# Patient Record
Sex: MALE | Race: WHITE | URBAN - METROPOLITAN AREA
[De-identification: names, ages, dates, MRNs, and addresses within clinical notes are randomized per-mention and may not be internally consistent; named-entity substitution may affect disease eponyms.]

---

## 2022-10-19 ENCOUNTER — OFFICE VISIT (OUTPATIENT)
Dept: ENDOCRINOLOGY | Age: 22
End: 2022-10-19
Payer: COMMERCIAL

## 2022-10-19 VITALS
SYSTOLIC BLOOD PRESSURE: 114 MMHG | DIASTOLIC BLOOD PRESSURE: 65 MMHG | HEIGHT: 69 IN | BODY MASS INDEX: 21.51 KG/M2 | WEIGHT: 145.2 LBS | HEART RATE: 81 BPM

## 2022-10-19 DIAGNOSIS — F64.0 GENDER DYSPHORIA IN ADULT: Primary | ICD-10-CM

## 2022-10-19 DIAGNOSIS — L65.9 HAIR LOSS: ICD-10-CM

## 2022-10-19 PROCEDURE — 99204 OFFICE O/P NEW MOD 45 MIN: CPT | Performed by: INTERNAL MEDICINE

## 2022-10-19 RX ORDER — FINASTERIDE 1 MG/1
1 TABLET, FILM COATED ORAL DAILY
COMMUNITY
Start: 2022-07-12

## 2022-10-19 RX ORDER — VORTIOXETINE 10 MG/1
10 TABLET, FILM COATED ORAL DAILY
COMMUNITY
Start: 2022-10-11

## 2022-10-19 RX ORDER — ESTRADIOL 2 MG/1
3 TABLET ORAL DAILY
COMMUNITY

## 2022-10-19 RX ORDER — SPIRONOLACTONE 50 MG/1
50 TABLET, FILM COATED ORAL 3 TIMES DAILY
COMMUNITY

## 2022-10-19 NOTE — PROGRESS NOTES
Chief Complaint   Patient presents with    Hormone Problem     Pcp and pharmacy verified   Male to [facility], [facility]     History of Present Illness: Maki Hansen is a 20 y.o. 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 Pt notes that she has been on the HRT for \"a year and a half\". Pt reports that she first new that she identified as female \"about 2 and a half years ago\". She is currently seeing a counselor \"I have been seeing him, even before I decided to start the transition\". She has been seeing Babak Dao. I will request records from Babak jose. She has been receiving HRT from [facility]. [facility] She is currently taking Estradiol 3mg (1-1/2 of the 2mg pills) per day (she has been on this dose for 8 months. She is taking Spironolactone 50mg TID. (She has been on this dose for 8 months). She is taking Finasteride 1mg daily which she is taking for \"hair loss\". She has noted breast development, fat redistribution, she notes some decrease in her libido and she notes that \"It has been taking some reconfiguration with my anti-depressants, but things are doing well. She notes her skin has softened and her face has become more rounded and less angular. She would like to know where she is biochemically and what more could be expected from a medical only and non-surgical intervention. She notes she did have laser hair removal, but she is still shaving about once every week and half. She notes she has had issues of mood swings \"my emotions feel the same, just clearer\". She denies issues of CP, SOB, palpitations, heat or cold intolerance, she notes she has had 2-3 \"hot flashes\" in the last 2-3 months. She denies issues of syncope or pre-presyncope. She notes that she does have a good social support structure. \"I am perusing changing my name. \"    \"I would like to try minoxidil but because of my eczema I can not take the topicals so I would like to discus an oral minoxidil. \"    History reviewed. No pertinent past medical history. Past Surgical History:   Procedure Laterality Date    MD REPAIR OF ANAL SPHINCTER,ADULT  2019     Current Outpatient Medications   Medication Sig    Trintellix 10 mg tablet Take 10 mg by mouth daily. finasteride (PROPECIA) 1 mg tablet Take 1 mg by mouth daily. spironolactone (ALDACTONE) 50 mg tablet Take 50 mg by mouth three (3) times daily. estradioL (Estrace) 2 mg tablet Take 3 mg by mouth daily. No current facility-administered medications for this visit.      No Known Allergies  Family History   Problem Relation Age of Onset    No Known Problems Mother     Diabetes Father         Type 1    No Known Problems Brother     No Known Problems Brother     No Known Problems Maternal Grandmother     Heart Disease Maternal Grandfather         CABG x3    No Known Problems Paternal Grandmother     Cancer Paternal Grandfather         Prostate     Social History     Socioeconomic History    Marital status: Not on file     Spouse name: Not on file    Number of children: Not on file    Years of education: Not on file    Highest education level: Not on file   Occupational History    Not on file   Tobacco Use    Smoking status: Former     Types: Cigarettes    Smokeless tobacco: Never   Substance and Sexual Activity    Alcohol use: Yes     Comment: occ    Drug use: Not Currently    Sexual activity: Not on file   Other Topics Concern    Not on file   Social History Narrative    Not on file     Social Determinants of Health     Financial Resource Strain: Not on file   Food Insecurity: Not on file   Transportation Needs: Not on file   Physical Activity: Not on file   Stress: Not on file   Social Connections: Not on file   Intimate Partner Violence: Not on file   Housing Stability: Not on file     Review of Systems:  Constitutional Symptoms: no fevers, chills, weight loss  Eyes: no blurry vision or double vision  Cardiovascular: no chest pain or palpitations  Respiratory: no cough or shortness of breath  Gastrointestinal: no dysphagia or abdominal pain  Musculoskeletal: no joint pains or weakness  Integumentary: no rashes  Neurological: no numbness, tingling, or headaches  Psychiatric: no depression or anxiety  Endocrine: no heat or cold intolerance, no polyuria or polydipsia    Physical Examination:  Blood pressure 114/65, pulse 81, height 5' 9\" (1.753 m), weight 145 lb 3.2 oz (65.9 kg). General: pleasant, no distress, good eye contact  HEENT: no exopthalmos, no periorbital edema, no scleral/conjunctival injection, EOMI, no lid lag or stare  Neck: supple, no thyromegaly, masses, lymph nodes, or carotid bruits, no supraclavicular or dorsocervical fat pads  Cardiovascular: regular, normal rate, normal S1 and S2, no murmurs/rubs/gallops, 2+ dorsalis pedis pulses bilaterally  Respiratory: clear to auscultation bilaterally  Gastrointestinal: soft, nontender, nondistended, no masses, no hepatosplenomegaly  Musculoskeletal: no proximal muscle weakness in upper or lower extremities  Integumentary: no acanthosis nigricans, no rashes, no edema,   Neurological: reflexes 2+ at biceps, no tremor  Psychiatric: normal mood and affect    Data Reviewed:     Assessment/Plan:   1. Gender dysphoria in adult    2. Hair loss    1) She has been taking Estrogen and Spironolactone for a year an a half, she is tolerating these well and notes she has had expected changes with the HRT. We did discuss what she could expect with continued therapy and potential side effects of HRT. She reports she does have a good network of social support. I will order Testosterone, Estrogen, CBC and CMP. We can discuss changing her dose of Estrogen, the Spironolactone and we can discuss starting her on progesterone. 2) Pt is taking Finasteride 1mg daily for her hair loss and wants to look into oral Minoxidil.  We will get her labs back and discuss options. Pt voices understanding and agreement with the plan.     RTC 4 months    Copy sent to:  [address]

## 2022-12-22 RX ORDER — SPIRONOLACTONE 100 MG/1
100 TABLET, FILM COATED ORAL 2 TIMES DAILY
Qty: 180 TABLET | Refills: 1 | Status: SHIPPED | OUTPATIENT
Start: 2022-12-22

## 2022-12-22 RX ORDER — ESTRADIOL 2 MG/1
2 TABLET ORAL 2 TIMES DAILY
Qty: 180 TABLET | Refills: 1 | Status: SHIPPED | OUTPATIENT
Start: 2022-12-22

## 2023-02-21 ENCOUNTER — OFFICE VISIT (OUTPATIENT)
Dept: ENDOCRINOLOGY | Age: 23
End: 2023-02-21
Payer: COMMERCIAL

## 2023-02-21 VITALS
SYSTOLIC BLOOD PRESSURE: 114 MMHG | BODY MASS INDEX: 21.98 KG/M2 | DIASTOLIC BLOOD PRESSURE: 62 MMHG | HEIGHT: 69 IN | HEART RATE: 87 BPM | WEIGHT: 148.4 LBS

## 2023-02-21 DIAGNOSIS — F64.0 GENDER DYSPHORIA IN ADULT: Primary | ICD-10-CM

## 2023-02-21 DIAGNOSIS — L65.9 ALOPECIA: ICD-10-CM

## 2023-02-21 PROCEDURE — 99214 OFFICE O/P EST MOD 30 MIN: CPT | Performed by: INTERNAL MEDICINE

## 2023-02-21 RX ORDER — ESTRADIOL 2 MG/1
2 TABLET ORAL 2 TIMES DAILY
Qty: 180 TABLET | Refills: 1 | Status: SHIPPED | OUTPATIENT
Start: 2023-02-21

## 2023-02-21 RX ORDER — SPIRONOLACTONE 100 MG/1
100 TABLET, FILM COATED ORAL 2 TIMES DAILY
Qty: 180 TABLET | Refills: 1 | Status: SHIPPED | OUTPATIENT
Start: 2023-02-21

## 2023-02-21 RX ORDER — FINASTERIDE 1 MG/1
1 TABLET, FILM COATED ORAL DAILY
Qty: 90 TABLET | Refills: 3 | Status: SHIPPED | OUTPATIENT
Start: 2023-02-21

## 2023-02-21 RX ORDER — PROMETHAZINE HYDROCHLORIDE 25 MG/1
TABLET ORAL AS NEEDED
COMMUNITY
Start: 2023-02-08

## 2023-02-21 NOTE — PROGRESS NOTES
Chief Complaint   Patient presents with    Hormone Problem     Pcp and pharmacy verified     History of Present Illness: Steven Ruano is a 20 y.o. 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 In December 2022 I increased her Estrogen to 2mg BID and her Spironolactone to 100mg BID. \"I did notice after getting back on the Estradiol. The nausea seems to be worse from the Trintellix. \"  She is taking the Estroen 2mg BID, She notes she does not get nausea in the evening or after she takes her Estrogen, but every morning when she wakes. She is taking the Spironolactone 100mg BID. She denies issues of CP, SOB, palpitations, HA, vision changes. He notes some breast tenderness. Pt is still taking the Trintellix 10mg every day for her depression. Pt is taking Finasteride 1mg daily. She feels that this dose is helping with the Alopecia. Overall she is feeling pleased with the overall changes and results from the HRT. She notes she did have laser hair removal, but she is still shaving about once every week and half. She denies any mood swings or mood changes. She denies issues of CP, SOB, palpitations, heat or cold intolerance, she notes she has had 2-3 \"hot flashes\" in the last 2-3 months. She denies issues of syncope or pre-presyncope. She notes that she does have a good social support structure. \"I am perusing changing my name. \"    Current Outpatient Medications   Medication Sig    promethazine (PHENERGAN) 25 mg tablet as needed. estradioL (Estrace) 2 mg tablet Take 1 Tablet by mouth two (2) times a day. spironolactone (ALDACTONE) 100 mg tablet Take 1 Tablet by mouth two (2) times a day. finasteride (PROPECIA) 1 mg tablet Take 1 Tablet by mouth daily. Trintellix 10 mg tablet Take 10 mg by mouth daily. No current facility-administered medications for this visit.      No Known Allergies  Review of Systems:  - Cardiovascular: no chest pain  - Neurological: no tremors  - Integumentary: skin is normal    Physical Examination:  Blood pressure 114/62, pulse 87, height 5' 9\" (1.753 m), weight 148 lb 6.4 oz (67.3 kg). General: pleasant, no distress, good eye contact   Neck: no thyromegaly or thyroid bruits  Cardiovascular: regular, normal rate, nl s1 and s2, no m/r/g   Integumentary: skin is normal, no edema  Neurological: reflexes 2+ at biceps, no tremors  Psychiatric: normal mood and affect    Data Reviewed:   - none new for review    Assessment/Plan:   1) Male to Female Transgender > Pt is doing well with the Estrogen 2mg BID. His Testosterone was still very high so we increased his Spironolactone to 100mg BID. I will order a Testosterone level, CBC and CMP today as well as an Estrogen level. We discussed that if his Testosterone is still very high we can discuss other Testosterone blocking agents. 2) Alopecia > Pt notes the Finasteride is helping with the Alopecia. I will re-order the Finasteride 1mg daily. Pt voices understanding and agreement with the plan.     RTC 4 months

## 2023-03-03 DIAGNOSIS — F64.0 GENDER DYSPHORIA IN ADULT: Primary | ICD-10-CM

## 2023-04-22 DIAGNOSIS — F64.0 GENDER DYSPHORIA IN ADULT: Primary | ICD-10-CM

## 2023-04-24 DIAGNOSIS — F64.0 GENDER DYSPHORIA IN ADULT: Primary | ICD-10-CM

## 2023-05-17 RX ORDER — SPIRONOLACTONE 100 MG/1
100 TABLET, FILM COATED ORAL 2 TIMES DAILY
COMMUNITY
Start: 2023-02-21

## 2023-05-17 RX ORDER — FINASTERIDE 1 MG/1
1 TABLET, FILM COATED ORAL DAILY
COMMUNITY
Start: 2023-02-21 | End: 2023-06-27 | Stop reason: SDUPTHER

## 2023-05-17 RX ORDER — PROMETHAZINE HYDROCHLORIDE 25 MG/1
TABLET ORAL PRN
COMMUNITY
Start: 2023-02-08 | End: 2023-06-27

## 2023-05-17 RX ORDER — ESTRADIOL 2 MG/1
2 TABLET ORAL DAILY
COMMUNITY
Start: 2023-02-21 | End: 2023-06-27 | Stop reason: SDUPTHER

## 2023-06-01 DIAGNOSIS — F64.0 GENDER DYSPHORIA IN ADULT: ICD-10-CM

## 2023-06-27 ENCOUNTER — OFFICE VISIT (OUTPATIENT)
Age: 23
End: 2023-06-27
Payer: COMMERCIAL

## 2023-06-27 VITALS
DIASTOLIC BLOOD PRESSURE: 70 MMHG | SYSTOLIC BLOOD PRESSURE: 114 MMHG | HEART RATE: 68 BPM | WEIGHT: 145 LBS | BODY MASS INDEX: 21.48 KG/M2 | HEIGHT: 69 IN

## 2023-06-27 DIAGNOSIS — F64.0 GENDER DYSPHORIA IN ADULT: Primary | ICD-10-CM

## 2023-06-27 DIAGNOSIS — L65.9 NONSCARRING HAIR LOSS, UNSPECIFIED: ICD-10-CM

## 2023-06-27 PROCEDURE — 99214 OFFICE O/P EST MOD 30 MIN: CPT | Performed by: INTERNAL MEDICINE

## 2023-06-27 RX ORDER — FINASTERIDE 1 MG/1
1 TABLET, FILM COATED ORAL DAILY
Qty: 90 TABLET | Refills: 1 | Status: SHIPPED | OUTPATIENT
Start: 2023-06-27

## 2023-06-27 RX ORDER — BICALUTAMIDE 50 MG/1
50 TABLET, FILM COATED ORAL DAILY
Qty: 90 TABLET | Refills: 3 | Status: SHIPPED | OUTPATIENT
Start: 2023-06-27

## 2023-06-27 RX ORDER — ESTRADIOL 2 MG/1
2 TABLET ORAL DAILY
Qty: 90 TABLET | Refills: 1 | Status: SHIPPED | OUTPATIENT
Start: 2023-06-27

## 2023-08-15 RX ORDER — ESTRADIOL 2 MG/1
2 TABLET ORAL 2 TIMES DAILY
Qty: 180 TABLET | Refills: 1 | Status: SHIPPED | OUTPATIENT
Start: 2023-08-15

## 2023-08-15 NOTE — TELEPHONE ENCOUNTER
Per last OV: In December 2022 I increased her Estrogen to 2mg BID. Patient states that she has been taking BID for about 2 years. Last prescription was written as once daily. Is running out.

## 2023-10-31 ENCOUNTER — OFFICE VISIT (OUTPATIENT)
Age: 23
End: 2023-10-31
Payer: COMMERCIAL

## 2023-10-31 VITALS
SYSTOLIC BLOOD PRESSURE: 118 MMHG | HEART RATE: 76 BPM | HEIGHT: 69 IN | BODY MASS INDEX: 21.48 KG/M2 | WEIGHT: 145 LBS | DIASTOLIC BLOOD PRESSURE: 76 MMHG

## 2023-10-31 DIAGNOSIS — L65.9 NONSCARRING HAIR LOSS, UNSPECIFIED: ICD-10-CM

## 2023-10-31 DIAGNOSIS — F64.0 GENDER DYSPHORIA IN ADULT: Primary | ICD-10-CM

## 2023-10-31 DIAGNOSIS — F64.0 GENDER DYSPHORIA IN ADULT: ICD-10-CM

## 2023-10-31 PROCEDURE — 99214 OFFICE O/P EST MOD 30 MIN: CPT | Performed by: INTERNAL MEDICINE

## 2023-10-31 RX ORDER — SPIRONOLACTONE 100 MG/1
100 TABLET, FILM COATED ORAL DAILY
Qty: 90 TABLET | Refills: 1 | Status: SHIPPED | OUTPATIENT
Start: 2023-10-31

## 2023-10-31 RX ORDER — ESTRADIOL 2 MG/1
2 TABLET ORAL 2 TIMES DAILY
Qty: 180 TABLET | Refills: 1 | Status: SHIPPED | OUTPATIENT
Start: 2023-10-31

## 2023-10-31 RX ORDER — FINASTERIDE 1 MG/1
1 TABLET, FILM COATED ORAL DAILY
Qty: 90 TABLET | Refills: 1 | Status: SHIPPED | OUTPATIENT
Start: 2023-10-31

## 2023-10-31 RX ORDER — PROGESTERONE 100 MG/1
100 CAPSULE ORAL DAILY
Qty: 90 CAPSULE | Refills: 1 | Status: SHIPPED | OUTPATIENT
Start: 2023-10-31

## 2023-10-31 RX ORDER — BICALUTAMIDE 50 MG/1
50 TABLET, FILM COATED ORAL DAILY
Qty: 90 TABLET | Refills: 1 | Status: SHIPPED | OUTPATIENT
Start: 2023-10-31

## 2023-10-31 NOTE — PROGRESS NOTES
Medication Sig    estradiol (ESTRACE) 2 MG tablet Take 1 tablet by mouth 2 times daily    finasteride (PROPECIA) 1 MG tablet Take 1 tablet by mouth daily    bicalutamide (CASODEX) 50 MG chemo tablet Take 1 tablet by mouth daily    spironolactone (ALDACTONE) 100 MG tablet Take 1 tablet by mouth 2 times daily     No current facility-administered medications for this visit. No Known Allergies  Review of Systems:  - Cardiovascular: no chest pain  - Neurological: no tremors  - Integumentary: skin is normal    Physical Examination:  Blood pressure 118/76, pulse 76, height 1.753 m (5' 9\"), weight 65.8 kg (145 lb). General: pleasant, no distress, good eye contact   Neck: no thyromegaly or thyroid bruits  Cardiovascular: regular, normal rate, nl s1 and s2, no m/r/g   Integumentary: skin is normal, no edema  Neurological: reflexes 2+ at biceps, no tremors  Psychiatric: normal mood and affect    Data Reviewed:   - none new for review    Assessment/Plan:   1) Male to Female Transgender > Pt is doing well with the Estrogen 2mg daily, Sprionolactone 100mg daily and Casodex 50mg daily. I will start pt on Progesterone 100mg daily. I will order a Testosterone level, CBC and CMP today as well as an Estrogen level. 2) Alopecia > Pt notes the Finasteride is helping with the Alopecia. I will have pt continue the Finasteride 1mg daily. Pt voices understanding and agreement with the plan.     RTC 6 months    Copy sent to:

## 2023-11-01 LAB
ALBUMIN SERPL-MCNC: 4.5 G/DL (ref 3.5–5)
ALBUMIN/GLOB SERPL: 1.4 (ref 1.1–2.2)
ALP SERPL-CCNC: 43 U/L (ref 45–117)
ALT SERPL-CCNC: 22 U/L (ref 12–78)
ANION GAP SERPL CALC-SCNC: 4 MMOL/L (ref 5–15)
AST SERPL-CCNC: 18 U/L (ref 15–37)
BILIRUB SERPL-MCNC: 0.7 MG/DL (ref 0.2–1)
BUN SERPL-MCNC: 17 MG/DL (ref 6–20)
BUN/CREAT SERPL: 18 (ref 12–20)
CALCIUM SERPL-MCNC: 9.3 MG/DL (ref 8.5–10.1)
CHLORIDE SERPL-SCNC: 104 MMOL/L (ref 97–108)
CO2 SERPL-SCNC: 28 MMOL/L (ref 21–32)
CREAT SERPL-MCNC: 0.92 MG/DL (ref 0.7–1.3)
ERYTHROCYTE [DISTWIDTH] IN BLOOD BY AUTOMATED COUNT: 11.9 % (ref 11.5–14.5)
GLOBULIN SER CALC-MCNC: 3.2 G/DL (ref 2–4)
GLUCOSE SERPL-MCNC: 98 MG/DL (ref 65–100)
HCT VFR BLD AUTO: 44.2 % (ref 36.6–50.3)
HGB BLD-MCNC: 15 G/DL (ref 12.1–17)
MCH RBC QN AUTO: 30.2 PG (ref 26–34)
MCHC RBC AUTO-ENTMCNC: 33.9 G/DL (ref 30–36.5)
MCV RBC AUTO: 89.1 FL (ref 80–99)
NRBC # BLD: 0 K/UL (ref 0–0.01)
NRBC BLD-RTO: 0 PER 100 WBC
PLATELET # BLD AUTO: 279 K/UL (ref 150–400)
PMV BLD AUTO: 10.1 FL (ref 8.9–12.9)
POTASSIUM SERPL-SCNC: 4 MMOL/L (ref 3.5–5.1)
PROT SERPL-MCNC: 7.7 G/DL (ref 6.4–8.2)
RBC # BLD AUTO: 4.96 M/UL (ref 4.1–5.7)
SODIUM SERPL-SCNC: 136 MMOL/L (ref 136–145)
WBC # BLD AUTO: 6.3 K/UL (ref 4.1–11.1)

## 2023-11-03 LAB — ESTROGEN SERPL-MCNC: 128 PG/ML (ref 56–213)

## 2023-11-04 LAB
TESTOST FREE SERPL-MCNC: 18.2 PG/ML (ref 9.3–26.5)
TESTOST SERPL-MCNC: 1162.4 NG/DL (ref 264–916)

## 2023-11-13 RX ORDER — BICALUTAMIDE 50 MG/1
50 TABLET, FILM COATED ORAL DAILY
Qty: 90 TABLET | Refills: 1 | Status: SHIPPED | OUTPATIENT
Start: 2023-11-13

## 2023-11-13 RX ORDER — SPIRONOLACTONE 100 MG/1
100 TABLET, FILM COATED ORAL DAILY
Qty: 90 TABLET | Refills: 1 | Status: SHIPPED | OUTPATIENT
Start: 2023-11-13

## 2023-12-27 RX ORDER — SPIRONOLACTONE 100 MG/1
100 TABLET, FILM COATED ORAL 2 TIMES DAILY
Qty: 180 TABLET | Refills: 3 | Status: SHIPPED | OUTPATIENT
Start: 2023-12-27

## 2024-01-25 ENCOUNTER — OFFICE VISIT (OUTPATIENT)
Age: 24
End: 2024-01-25
Payer: COMMERCIAL

## 2024-01-25 VITALS
WEIGHT: 151.8 LBS | DIASTOLIC BLOOD PRESSURE: 69 MMHG | HEIGHT: 69 IN | HEART RATE: 85 BPM | SYSTOLIC BLOOD PRESSURE: 115 MMHG | BODY MASS INDEX: 22.48 KG/M2

## 2024-01-25 DIAGNOSIS — F64.0 GENDER DYSPHORIA IN ADULT: Primary | ICD-10-CM

## 2024-01-25 DIAGNOSIS — L65.9 NONSCARRING HAIR LOSS, UNSPECIFIED: ICD-10-CM

## 2024-01-25 DIAGNOSIS — F64.0 GENDER DYSPHORIA IN ADULT: ICD-10-CM

## 2024-01-25 PROCEDURE — 99214 OFFICE O/P EST MOD 30 MIN: CPT | Performed by: INTERNAL MEDICINE

## 2024-01-25 RX ORDER — ESTRADIOL 2 MG/1
2 TABLET ORAL 2 TIMES DAILY
Qty: 180 TABLET | Refills: 1 | Status: SHIPPED | OUTPATIENT
Start: 2024-01-25

## 2024-01-25 RX ORDER — PROGESTERONE 100 MG/1
100 CAPSULE ORAL DAILY
Qty: 90 CAPSULE | Refills: 1 | Status: SHIPPED | OUTPATIENT
Start: 2024-01-25

## 2024-01-25 RX ORDER — SPIRONOLACTONE 100 MG/1
100 TABLET, FILM COATED ORAL DAILY
Qty: 180 TABLET | Refills: 1 | Status: SHIPPED | OUTPATIENT
Start: 2024-01-25 | End: 2024-01-25 | Stop reason: ALTCHOICE

## 2024-01-25 RX ORDER — ARIPIPRAZOLE 10 MG/1
10 TABLET ORAL DAILY
COMMUNITY

## 2024-01-25 RX ORDER — BICALUTAMIDE 50 MG/1
50 TABLET, FILM COATED ORAL DAILY
Qty: 90 TABLET | Refills: 1 | Status: SHIPPED | OUTPATIENT
Start: 2024-01-25

## 2024-01-25 RX ORDER — DUTASTERIDE 0.5 MG/1
0.5 CAPSULE, LIQUID FILLED ORAL DAILY
Qty: 30 CAPSULE | Refills: 5 | Status: SHIPPED | OUTPATIENT
Start: 2024-01-25

## 2024-01-25 NOTE — PROGRESS NOTES
daily    dutasteride (AVODART) 0.5 MG capsule Take 1 capsule by mouth daily    ARIPiprazole (ABILIFY) 10 MG tablet Take 1 tablet by mouth daily     No current facility-administered medications for this visit.     No Known Allergies  Review of Systems:  - Cardiovascular: no chest pain  - Neurological: no tremors  - Integumentary: skin is normal    Physical Examination:  Blood pressure 115/69, pulse 85, height 1.753 m (5' 9\"), weight 68.9 kg (151 lb 12.8 oz).  General: pleasant, no distress, good eye contact   Neck: no thyromegaly or thyroid bruits  Cardiovascular: regular, normal rate, nl s1 and s2, no m/r/g   Integumentary: skin is normal, no edema  Neurological: reflexes 2+ at biceps, no tremors  Psychiatric: normal mood and affect    Data Reviewed:   - none new for review    Assessment/Plan:   1) Male to Female Transgender > Pt is doing well with the Estrogen 2mg daily, and Casodex 50mg daily and Progesterone 100mg daily. She would like to try stopping the Spironolactone since she is taking the Casodex and the spironolactone is still causing polyuria.  I will order a Testosterone level, CBC and CMP today as well as an Estrogen level.     2) Alopecia > Pt notes the Finasteride is not helping with the Alopecia. I will start her on Dutasteride 0.5mg daily.     Pt voices understanding and agreement with the plan.    RTC 6 months

## 2024-01-26 LAB
ALBUMIN SERPL-MCNC: 4.3 G/DL (ref 3.5–5)
ALBUMIN/GLOB SERPL: 1.6 (ref 1.1–2.2)
ALP SERPL-CCNC: 38 U/L (ref 45–117)
ALT SERPL-CCNC: 43 U/L (ref 12–78)
ANION GAP SERPL CALC-SCNC: 4 MMOL/L (ref 5–15)
AST SERPL-CCNC: 25 U/L (ref 15–37)
BILIRUB SERPL-MCNC: 0.5 MG/DL (ref 0.2–1)
BUN SERPL-MCNC: 20 MG/DL (ref 6–20)
BUN/CREAT SERPL: 24 (ref 12–20)
CALCIUM SERPL-MCNC: 9.5 MG/DL (ref 8.5–10.1)
CHLORIDE SERPL-SCNC: 107 MMOL/L (ref 97–108)
CO2 SERPL-SCNC: 28 MMOL/L (ref 21–32)
CREAT SERPL-MCNC: 0.82 MG/DL (ref 0.7–1.3)
ERYTHROCYTE [DISTWIDTH] IN BLOOD BY AUTOMATED COUNT: 12 % (ref 11.5–14.5)
GLOBULIN SER CALC-MCNC: 2.7 G/DL (ref 2–4)
GLUCOSE SERPL-MCNC: 99 MG/DL (ref 65–100)
HCT VFR BLD AUTO: 41.8 % (ref 36.6–50.3)
HGB BLD-MCNC: 13.8 G/DL (ref 12.1–17)
MCH RBC QN AUTO: 30.5 PG (ref 26–34)
MCHC RBC AUTO-ENTMCNC: 33 G/DL (ref 30–36.5)
MCV RBC AUTO: 92.3 FL (ref 80–99)
NRBC # BLD: 0 K/UL (ref 0–0.01)
NRBC BLD-RTO: 0 PER 100 WBC
PLATELET # BLD AUTO: 243 K/UL (ref 150–400)
PMV BLD AUTO: 10.4 FL (ref 8.9–12.9)
POTASSIUM SERPL-SCNC: 4 MMOL/L (ref 3.5–5.1)
PROT SERPL-MCNC: 7 G/DL (ref 6.4–8.2)
RBC # BLD AUTO: 4.53 M/UL (ref 4.1–5.7)
SODIUM SERPL-SCNC: 139 MMOL/L (ref 136–145)
WBC # BLD AUTO: 5.3 K/UL (ref 4.1–11.1)

## 2024-01-29 LAB — PROGEST SERPL-MCNC: 0.8 NG/ML (ref 0–0.5)

## 2024-01-30 LAB
ESTROGEN SERPL-MCNC: 421 PG/ML (ref 56–213)
TESTOST SERPL-MCNC: 1614 NG/DL (ref 264–916)

## 2025-01-17 RX ORDER — SPIRONOLACTONE 100 MG/1
100 TABLET, FILM COATED ORAL 2 TIMES DAILY
Qty: 60 TABLET | Refills: 0 | Status: SHIPPED | OUTPATIENT
Start: 2025-01-17

## 2025-01-18 RX ORDER — SPIRONOLACTONE 100 MG/1
100 TABLET, FILM COATED ORAL 2 TIMES DAILY
Qty: 180 TABLET | OUTPATIENT
Start: 2025-01-18

## 2025-02-19 ENCOUNTER — TELEPHONE (OUTPATIENT)
Age: 25
End: 2025-02-19

## 2025-02-19 NOTE — TELEPHONE ENCOUNTER
FYI:[facility] had faxed a refill encounter for Spironolactone. No future appointment noted.  Spoke with patient. Patient states that no future appointment is needed.   Sent refill request back to [facility] declining the rx.